# Patient Record
Sex: MALE | Race: WHITE | NOT HISPANIC OR LATINO | URBAN - METROPOLITAN AREA
[De-identification: names, ages, dates, MRNs, and addresses within clinical notes are randomized per-mention and may not be internally consistent; named-entity substitution may affect disease eponyms.]

---

## 2022-03-28 ENCOUNTER — OFFICE (OUTPATIENT)
Dept: URBAN - METROPOLITAN AREA CLINIC 79 | Facility: CLINIC | Age: 52
End: 2022-03-28

## 2022-03-28 VITALS
WEIGHT: 168 LBS | SYSTOLIC BLOOD PRESSURE: 120 MMHG | DIASTOLIC BLOOD PRESSURE: 70 MMHG | HEART RATE: 113 BPM | HEIGHT: 65 IN | TEMPERATURE: 97.5 F

## 2022-03-28 DIAGNOSIS — R19.4 CHANGE IN BOWEL HABIT: ICD-10-CM

## 2022-03-28 DIAGNOSIS — K59.09 OTHER CONSTIPATION: ICD-10-CM

## 2022-03-28 DIAGNOSIS — R12 HEARTBURN: ICD-10-CM

## 2022-03-28 DIAGNOSIS — R19.7 DIARRHEA, UNSPECIFIED: ICD-10-CM

## 2022-03-28 DIAGNOSIS — R19.8 OTHER SPECIFIED SYMPTOMS AND SIGNS INVOLVING THE DIGESTIVE S: ICD-10-CM

## 2022-03-28 DIAGNOSIS — K62.5 HEMORRHAGE OF ANUS AND RECTUM: ICD-10-CM

## 2022-03-28 DIAGNOSIS — R10.13 EPIGASTRIC PAIN: ICD-10-CM

## 2022-03-28 DIAGNOSIS — K30 FUNCTIONAL DYSPEPSIA: ICD-10-CM

## 2022-03-28 DIAGNOSIS — Z87.891 PERSONAL HISTORY OF NICOTINE DEPENDENCE: ICD-10-CM

## 2022-03-28 PROCEDURE — 99204 OFFICE O/P NEW MOD 45 MIN: CPT | Performed by: PHYSICIAN ASSISTANT

## 2022-03-28 NOTE — SERVICEHPINOTES
DANILO EMERY   is a   52   year old white male who is being seen in consultation at the request of   MACY LAMB   for ov prior to colonoscopy. He has BMs 3-5 x/day with irregular stools with mainly diarrhea and over past year new BRBPR. He has mostly diarrhea given BSS type 4-7 range. He notes BRBPR seen on wipe, toilet bowl water, and in stool that is bright red with events at most qod to few days at a time: No pain with defecation. No prior colonoscopy. No fm h/o CRC or colonic polyps. He also mentions h/o GERD for 20 yrs that has been managed on OTC PPIs on and off. He mentions upper abdominal pains since 12/2021 that led to PCP visit who advised RUQ u/s, which he admits was not done due to pain improving on its own. He is on rx Omeprazole 40 mg qd x 3-4 months that helps. Rare NSAID use. Former smoker in teenage years Quit years ago. He is smoker recreational marijuana. H/o anxiety. No known cardiac or pulmonary disease. Denies chest pain, dysphagia, voice change, cough, n/v, decreased appetite,  lower abdominal pain, melena, weight loss. br 
br
br

## 2022-04-28 ENCOUNTER — OFFICE (OUTPATIENT)
Dept: URBAN - METROPOLITAN AREA CLINIC 30 | Facility: CLINIC | Age: 52
End: 2022-04-28
Payer: COMMERCIAL

## 2022-04-28 ENCOUNTER — OFFICE (OUTPATIENT)
Dept: URBAN - METROPOLITAN AREA PATHOLOGY 18 | Facility: PATHOLOGY | Age: 52
End: 2022-04-28
Payer: COMMERCIAL

## 2022-04-28 VITALS
RESPIRATION RATE: 20 BRPM | TEMPERATURE: 98.6 F | HEART RATE: 101 BPM | SYSTOLIC BLOOD PRESSURE: 130 MMHG | DIASTOLIC BLOOD PRESSURE: 90 MMHG | SYSTOLIC BLOOD PRESSURE: 122 MMHG | DIASTOLIC BLOOD PRESSURE: 81 MMHG | WEIGHT: 168 LBS | HEART RATE: 85 BPM | RESPIRATION RATE: 23 BRPM | SYSTOLIC BLOOD PRESSURE: 140 MMHG | HEART RATE: 103 BPM | TEMPERATURE: 97.5 F | HEART RATE: 76 BPM | DIASTOLIC BLOOD PRESSURE: 83 MMHG | DIASTOLIC BLOOD PRESSURE: 95 MMHG | HEART RATE: 99 BPM | DIASTOLIC BLOOD PRESSURE: 86 MMHG | HEART RATE: 95 BPM | RESPIRATION RATE: 18 BRPM | HEIGHT: 65 IN | OXYGEN SATURATION: 100 % | RESPIRATION RATE: 17 BRPM | DIASTOLIC BLOOD PRESSURE: 76 MMHG | HEART RATE: 100 BPM | RESPIRATION RATE: 16 BRPM | SYSTOLIC BLOOD PRESSURE: 120 MMHG | DIASTOLIC BLOOD PRESSURE: 85 MMHG | RESPIRATION RATE: 19 BRPM | DIASTOLIC BLOOD PRESSURE: 104 MMHG | SYSTOLIC BLOOD PRESSURE: 123 MMHG | OXYGEN SATURATION: 98 % | HEART RATE: 104 BPM | RESPIRATION RATE: 22 BRPM | SYSTOLIC BLOOD PRESSURE: 129 MMHG | DIASTOLIC BLOOD PRESSURE: 73 MMHG | SYSTOLIC BLOOD PRESSURE: 169 MMHG | SYSTOLIC BLOOD PRESSURE: 121 MMHG

## 2022-04-28 DIAGNOSIS — K63.5 POLYP OF COLON: ICD-10-CM

## 2022-04-28 DIAGNOSIS — R19.7 DIARRHEA, UNSPECIFIED: ICD-10-CM

## 2022-04-28 DIAGNOSIS — D12.3 BENIGN NEOPLASM OF TRANSVERSE COLON: ICD-10-CM

## 2022-04-28 DIAGNOSIS — K62.1 RECTAL POLYP: ICD-10-CM

## 2022-04-28 DIAGNOSIS — K62.5 HEMORRHAGE OF ANUS AND RECTUM: ICD-10-CM

## 2022-04-28 PROCEDURE — 88305 TISSUE EXAM BY PATHOLOGIST: CPT | Performed by: PATHOLOGY

## 2022-05-03 ENCOUNTER — OFFICE (OUTPATIENT)
Dept: URBAN - METROPOLITAN AREA CLINIC 30 | Facility: CLINIC | Age: 52
End: 2022-05-03

## 2022-05-03 ENCOUNTER — OFFICE (OUTPATIENT)
Dept: URBAN - METROPOLITAN AREA PATHOLOGY 18 | Facility: PATHOLOGY | Age: 52
End: 2022-05-03
Payer: COMMERCIAL

## 2022-05-03 VITALS
HEART RATE: 109 BPM | DIASTOLIC BLOOD PRESSURE: 75 MMHG | HEIGHT: 65 IN | DIASTOLIC BLOOD PRESSURE: 87 MMHG | TEMPERATURE: 98.6 F | SYSTOLIC BLOOD PRESSURE: 132 MMHG | DIASTOLIC BLOOD PRESSURE: 89 MMHG | OXYGEN SATURATION: 97 % | DIASTOLIC BLOOD PRESSURE: 94 MMHG | SYSTOLIC BLOOD PRESSURE: 146 MMHG | SYSTOLIC BLOOD PRESSURE: 120 MMHG | OXYGEN SATURATION: 98 % | RESPIRATION RATE: 20 BRPM | DIASTOLIC BLOOD PRESSURE: 74 MMHG | TEMPERATURE: 97.9 F | OXYGEN SATURATION: 99 % | SYSTOLIC BLOOD PRESSURE: 136 MMHG | RESPIRATION RATE: 18 BRPM | SYSTOLIC BLOOD PRESSURE: 155 MMHG | HEART RATE: 88 BPM | WEIGHT: 168 LBS | HEART RATE: 85 BPM | RESPIRATION RATE: 17 BRPM

## 2022-05-03 DIAGNOSIS — K44.9 DIAPHRAGMATIC HERNIA WITHOUT OBSTRUCTION OR GANGRENE: ICD-10-CM

## 2022-05-03 DIAGNOSIS — K22.89 OTHER SPECIFIED DISEASE OF ESOPHAGUS: ICD-10-CM

## 2022-05-03 DIAGNOSIS — R12 HEARTBURN: ICD-10-CM

## 2022-05-03 DIAGNOSIS — R68.81 EARLY SATIETY: ICD-10-CM

## 2022-05-03 PROCEDURE — 88305 TISSUE EXAM BY PATHOLOGIST: CPT | Performed by: PATHOLOGY

## 2022-05-03 PROCEDURE — 88312 SPECIAL STAINS GROUP 1: CPT | Performed by: PATHOLOGY

## 2022-06-15 ENCOUNTER — OFFICE (OUTPATIENT)
Dept: URBAN - METROPOLITAN AREA CLINIC 34 | Facility: CLINIC | Age: 52
End: 2022-06-15

## 2022-06-15 VITALS
SYSTOLIC BLOOD PRESSURE: 140 MMHG | TEMPERATURE: 97.8 F | HEIGHT: 65 IN | DIASTOLIC BLOOD PRESSURE: 95 MMHG | WEIGHT: 167 LBS | HEART RATE: 87 BPM

## 2022-06-15 DIAGNOSIS — K59.09 OTHER CONSTIPATION: ICD-10-CM

## 2022-06-15 DIAGNOSIS — K62.5 HEMORRHAGE OF ANUS AND RECTUM: ICD-10-CM

## 2022-06-15 DIAGNOSIS — K63.5 POLYP OF COLON: ICD-10-CM

## 2022-06-15 PROCEDURE — 99214 OFFICE O/P EST MOD 30 MIN: CPT | Performed by: PHYSICIAN ASSISTANT

## 2022-06-15 NOTE — SERVICEHPINOTES
DANILO EMERY   is a   51 yo white male who is here for f/u visit concerning rectal bleeding. He was last seen in 03/2022 for same concern that led to colonoscopy. His colonoscopy noted  large internal hemorrhoids and removed benign colonic polyps RC 5 yrs. At this time ongoing events of BRBPR seen on wipe 3-4x/week: No pain with defecation. He has BMs 2-3x/day, BSS type 4-5 with sensation of incomplete evacuation. No recent trial of metamucil or benefiber. Concerning his GERD it has improved on rx Omeprazole 40 mg qd used as instructed. Recent EGD 2022 noting small hiatal hernia and bx benign from esophagus. Rare NSAID use. Former smoker. Denies n/v, abdominal pain, melena, blood in stool, weight loss. 
br

## 2022-08-30 ENCOUNTER — OFFICE (OUTPATIENT)
Dept: URBAN - METROPOLITAN AREA CLINIC 79 | Facility: CLINIC | Age: 52
End: 2022-08-30

## 2022-08-30 VITALS
DIASTOLIC BLOOD PRESSURE: 79 MMHG | WEIGHT: 169.4 LBS | HEART RATE: 115 BPM | HEIGHT: 65 IN | SYSTOLIC BLOOD PRESSURE: 118 MMHG | TEMPERATURE: 96.5 F

## 2022-08-30 DIAGNOSIS — K62.5 HEMORRHAGE OF ANUS AND RECTUM: ICD-10-CM

## 2022-08-30 DIAGNOSIS — K64.1 SECOND DEGREE HEMORRHOIDS: ICD-10-CM

## 2022-08-30 PROCEDURE — 46221 LIGATION OF HEMORRHOID(S): CPT | Performed by: INTERNAL MEDICINE

## 2022-09-15 ENCOUNTER — OFFICE (OUTPATIENT)
Dept: URBAN - METROPOLITAN AREA CLINIC 79 | Facility: CLINIC | Age: 52
End: 2022-09-15

## 2022-09-15 VITALS
SYSTOLIC BLOOD PRESSURE: 136 MMHG | WEIGHT: 169 LBS | HEIGHT: 65 IN | HEART RATE: 111 BPM | TEMPERATURE: 97.8 F | DIASTOLIC BLOOD PRESSURE: 86 MMHG

## 2022-09-15 DIAGNOSIS — K64.1 SECOND DEGREE HEMORRHOIDS: ICD-10-CM

## 2022-09-15 DIAGNOSIS — K62.5 HEMORRHAGE OF ANUS AND RECTUM: ICD-10-CM

## 2022-09-15 PROCEDURE — 46221 LIGATION OF HEMORRHOID(S): CPT | Performed by: INTERNAL MEDICINE

## 2023-04-05 ENCOUNTER — OFFICE (OUTPATIENT)
Dept: URBAN - METROPOLITAN AREA CLINIC 79 | Facility: CLINIC | Age: 53
End: 2023-04-05

## 2023-04-05 VITALS
WEIGHT: 160 LBS | SYSTOLIC BLOOD PRESSURE: 115 MMHG | DIASTOLIC BLOOD PRESSURE: 63 MMHG | TEMPERATURE: 97.5 F | HEIGHT: 65 IN | HEART RATE: 88 BPM

## 2023-04-05 DIAGNOSIS — K62.5 HEMORRHAGE OF ANUS AND RECTUM: ICD-10-CM

## 2023-04-05 DIAGNOSIS — R19.7 DIARRHEA, UNSPECIFIED: ICD-10-CM

## 2023-04-05 DIAGNOSIS — R10.10 UPPER ABDOMINAL PAIN, UNSPECIFIED: ICD-10-CM

## 2023-04-05 DIAGNOSIS — R53.83 OTHER FATIGUE: ICD-10-CM

## 2023-04-05 LAB
CBC/DIFF AMBIGUOUS DEFAULT: BASO (ABSOLUTE): 0 X10E3/UL (ref 0–0.2)
CBC/DIFF AMBIGUOUS DEFAULT: BASOS: 0 %
CBC/DIFF AMBIGUOUS DEFAULT: EOS (ABSOLUTE): 0.1 X10E3/UL (ref 0–0.4)
CBC/DIFF AMBIGUOUS DEFAULT: EOS: 2 %
CBC/DIFF AMBIGUOUS DEFAULT: HEMATOCRIT: 30.3 % — LOW (ref 37.5–51)
CBC/DIFF AMBIGUOUS DEFAULT: HEMATOLOGY COMMENTS: (no result)
CBC/DIFF AMBIGUOUS DEFAULT: HEMOGLOBIN: 10.1 G/DL — LOW (ref 13–17.7)
CBC/DIFF AMBIGUOUS DEFAULT: IMMATURE CELLS: (no result)
CBC/DIFF AMBIGUOUS DEFAULT: IMMATURE GRANS (ABS): 0 X10E3/UL (ref 0–0.1)
CBC/DIFF AMBIGUOUS DEFAULT: IMMATURE GRANULOCYTES: 0 %
CBC/DIFF AMBIGUOUS DEFAULT: LYMPHS (ABSOLUTE): 1.6 X10E3/UL (ref 0.7–3.1)
CBC/DIFF AMBIGUOUS DEFAULT: LYMPHS: 35 %
CBC/DIFF AMBIGUOUS DEFAULT: MCH: 27.7 PG (ref 26.6–33)
CBC/DIFF AMBIGUOUS DEFAULT: MCHC: 33.3 G/DL (ref 31.5–35.7)
CBC/DIFF AMBIGUOUS DEFAULT: MCV: 83 FL (ref 79–97)
CBC/DIFF AMBIGUOUS DEFAULT: MONOCYTES(ABSOLUTE): 0.4 X10E3/UL (ref 0.1–0.9)
CBC/DIFF AMBIGUOUS DEFAULT: MONOCYTES: 8 %
CBC/DIFF AMBIGUOUS DEFAULT: NEUTROPHILS (ABSOLUTE): 2.6 X10E3/UL (ref 1.4–7)
CBC/DIFF AMBIGUOUS DEFAULT: NEUTROPHILS: 55 %
CBC/DIFF AMBIGUOUS DEFAULT: NRBC: (no result)
CBC/DIFF AMBIGUOUS DEFAULT: PLATELETS: 212 X10E3/UL (ref 150–450)
CBC/DIFF AMBIGUOUS DEFAULT: RBC: 3.65 X10E6/UL — LOW (ref 4.14–5.8)
CBC/DIFF AMBIGUOUS DEFAULT: RDW: 14.5 % (ref 11.6–15.4)
CBC/DIFF AMBIGUOUS DEFAULT: WBC: 4.7 X10E3/UL (ref 3.4–10.8)
CELIAC DISEASE PANEL: ENDOMYSIAL ANTIBODY IGA: NEGATIVE
CELIAC DISEASE PANEL: IMMUNOGLOBULIN A, QN, SERUM: 201 MG/DL (ref 90–386)
CELIAC DISEASE PANEL: T-TRANSGLUTAMINASE (TTG) IGA: <2 U/ML
COMP. METABOLIC PANEL (14): A/G RATIO: 2 (ref 1.2–2.2)
COMP. METABOLIC PANEL (14): ALBUMIN: 4.7 G/DL (ref 3.8–4.9)
COMP. METABOLIC PANEL (14): ALKALINE PHOSPHATASE: 102 IU/L (ref 44–121)
COMP. METABOLIC PANEL (14): ALT (SGPT): 39 IU/L (ref 0–44)
COMP. METABOLIC PANEL (14): AST (SGOT): 52 IU/L — HIGH (ref 0–40)
COMP. METABOLIC PANEL (14): BILIRUBIN, TOTAL: 0.5 MG/DL (ref 0–1.2)
COMP. METABOLIC PANEL (14): BUN/CREATININE RATIO: 10 (ref 9–20)
COMP. METABOLIC PANEL (14): BUN: 11 MG/DL (ref 6–24)
COMP. METABOLIC PANEL (14): CALCIUM: 8.8 MG/DL (ref 8.7–10.2)
COMP. METABOLIC PANEL (14): CARBON DIOXIDE, TOTAL: 21 MMOL/L (ref 20–29)
COMP. METABOLIC PANEL (14): CHLORIDE: 80 MMOL/L — LOW (ref 96–106)
COMP. METABOLIC PANEL (14): CREATININE: 1.09 MG/DL (ref 0.76–1.27)
COMP. METABOLIC PANEL (14): EGFR: 81 ML/MIN/1.73 (ref 59–?)
COMP. METABOLIC PANEL (14): GLOBULIN, TOTAL: 2.3 G/DL (ref 1.5–4.5)
COMP. METABOLIC PANEL (14): GLUCOSE: 104 MG/DL — HIGH (ref 70–99)
COMP. METABOLIC PANEL (14): POTASSIUM: 3 MMOL/L — LOW (ref 3.5–5.2)
COMP. METABOLIC PANEL (14): PROTEIN, TOTAL: 7 G/DL (ref 6–8.5)
COMP. METABOLIC PANEL (14): SODIUM: 122 MMOL/L — LOW (ref 134–144)
HCV ANTIBODY RFX TO QUANT PCR: HCV AB: NON REACTIVE
HEMOGLOBIN A1C: 5.8 % — HIGH (ref 4.8–5.6)
INTERPRETATION: (no result)
PT AND PTT: APTT: 26 SEC (ref 24–33)
PT AND PTT: INR: 1 (ref 0.9–1.2)
PT AND PTT: PROTHROMBIN TIME: 10.4 SEC (ref 9.1–12)
VITAMIN D, 25-HYDROXY: 25.6 NG/ML — LOW (ref 30–100)

## 2023-04-05 PROCEDURE — 99215 OFFICE O/P EST HI 40 MIN: CPT | Performed by: PHYSICIAN ASSISTANT

## 2023-04-05 NOTE — SERVICEHPINOTES
52 yo male presents with recent flare-up of upper abdominal soreness. Has had this issue in the past, for at least a year. Has discomfort across upper abdomen constantly in the past week. Pain is 1-2 of 10 constantly, but can flare up to 5 of 10 in intensity and lasts for 15-20 minutes when more intense. He sits and takes deep breaths and pain will subside. He denies pain triggered by PO intake or associated with BMs. br
crow He has h/o acid reflux and took OTC meds for a long time but has been on omeprazole 40 mg since late 2021. Had EGD and colonoscopy in 2022 with benign findings. Has had hemorrhoidal bandings due to hemorrhoidal bleeding. He had tubular adenoma, 5-year f/u advised. Ileal and random colon biopsies negative. br
crow He went to Urgent care last week because he "felt horrible" - had nausea, diarrhea, lethargy/fatigue. Was told he had anemia. Just had labs this week with elevated creatinine and low-normal ferritin, iron sat 22, folate, B12. No CBC or liver enzymes. crow maria Almost always has diarrhea or loose stools since 2021. Saw us for this in 2022 but only had the colonoscopy and no further evaluation. Did have negative duodenal bx at time of EGD. crow
crow Drinks several beers a day. Can see food particles in stools. Denies a lot of gas/bloating. 
br
br No abdominal imaging other than negative GES. crow maria Has had to miss work for 1-2 days a month for the past 6 months due to his symptoms - fatigue, pain, diarrhea. crow

crow Had had some skin issues on arms in the past 6 months. Was going to see derm but felt symptoms were improving so hasn't gone. crow

## 2023-04-05 NOTE — INTERFACERESULTNOTES
Anemia - please refer to hematologist for further evaluation and treatment. Low potassium - please increase high potassium foods daily for now (coconut water, cantaloupe, sweet potatoes, bananas, etc. Low vitamin D, please take 5000 IU/day with food. Hep C test is negative. Please stop drinking alcohol. Repeat a CMP in 10-14 days. Schedule f/u visit for after the banding in May.

## 2023-04-07 LAB
C DIFFICILE TOXIN GENE NAA: NEGATIVE
CALPROTECTIN, FECAL: 56 UG/G (ref 0–120)
CELIAC DISEASE COMPREHENSIVE: DEAMIDATED GLIADIN ABS, IGA: (no result) UNITS
CELIAC DISEASE COMPREHENSIVE: DEAMIDATED GLIADIN ABS, IGG: (no result)
CELIAC DISEASE COMPREHENSIVE: ENDOMYSIAL ANTIBODY IGA: (no result)
CELIAC DISEASE COMPREHENSIVE: IMMUNOGLOBULIN A, QN, SERUM: (no result)
CELIAC DISEASE COMPREHENSIVE: T-TRANSGLUTAMINASE (TTG) IGA: (no result)
CELIAC DISEASE COMPREHENSIVE: T-TRANSGLUTAMINASE (TTG) IGG: (no result)
GIARDIA LAMBLIA AG, EIA: NEGATIVE
OVA + PARASITE EXAM: (no result)
PANCREATIC ELASTASE, FECAL: >500 UG ELAST./G
REQUEST PROBLEM: (no result)
RESULT: RESULT 1: (no result)
STOOL CULTURE: CAMPYLOBACTER CULTURE: (no result)
STOOL CULTURE: E COLI SHIGA TOXIN EIA: NEGATIVE
STOOL CULTURE: SALMONELLA/SHIGELLA SCREEN: (no result)

## 2023-04-14 PROBLEM — R68.81 EARLY SATIETY: Status: ACTIVE | Noted: 2022-05-03

## 2023-05-02 ENCOUNTER — OFFICE (OUTPATIENT)
Dept: URBAN - METROPOLITAN AREA CLINIC 79 | Facility: CLINIC | Age: 53
End: 2023-05-02

## 2023-05-02 VITALS
WEIGHT: 158 LBS | HEIGHT: 65 IN | TEMPERATURE: 98.8 F | SYSTOLIC BLOOD PRESSURE: 114 MMHG | DIASTOLIC BLOOD PRESSURE: 75 MMHG | HEART RATE: 83 BPM

## 2023-05-02 DIAGNOSIS — K64.1 SECOND DEGREE HEMORRHOIDS: ICD-10-CM

## 2023-05-02 DIAGNOSIS — K62.5 HEMORRHAGE OF ANUS AND RECTUM: ICD-10-CM

## 2023-05-02 PROCEDURE — 46221 LIGATION OF HEMORRHOID(S): CPT | Performed by: INTERNAL MEDICINE

## 2023-05-24 ENCOUNTER — OFFICE (OUTPATIENT)
Dept: URBAN - METROPOLITAN AREA CLINIC 79 | Facility: CLINIC | Age: 53
End: 2023-05-24

## 2023-05-24 VITALS
SYSTOLIC BLOOD PRESSURE: 119 MMHG | HEART RATE: 83 BPM | HEIGHT: 65 IN | WEIGHT: 161 LBS | DIASTOLIC BLOOD PRESSURE: 68 MMHG | TEMPERATURE: 98.6 F

## 2023-05-24 DIAGNOSIS — D64.9 ANEMIA, UNSPECIFIED: ICD-10-CM

## 2023-05-24 DIAGNOSIS — R19.7 DIARRHEA, UNSPECIFIED: ICD-10-CM

## 2023-05-24 LAB
CBC WITH DIFFERENTIAL/PLATELET: BASO (ABSOLUTE): 0.1 X10E3/UL (ref 0–0.2)
CBC WITH DIFFERENTIAL/PLATELET: BASOS: 1 %
CBC WITH DIFFERENTIAL/PLATELET: EOS (ABSOLUTE): 0.3 X10E3/UL (ref 0–0.4)
CBC WITH DIFFERENTIAL/PLATELET: EOS: 5 %
CBC WITH DIFFERENTIAL/PLATELET: HEMATOCRIT: 30.2 % — LOW (ref 37.5–51)
CBC WITH DIFFERENTIAL/PLATELET: HEMATOLOGY COMMENTS: (no result)
CBC WITH DIFFERENTIAL/PLATELET: HEMOGLOBIN: 9.3 G/DL — LOW (ref 13–17.7)
CBC WITH DIFFERENTIAL/PLATELET: IMMATURE CELLS: (no result)
CBC WITH DIFFERENTIAL/PLATELET: IMMATURE GRANS (ABS): 0 X10E3/UL (ref 0–0.1)
CBC WITH DIFFERENTIAL/PLATELET: IMMATURE GRANULOCYTES: 0 %
CBC WITH DIFFERENTIAL/PLATELET: LYMPHS (ABSOLUTE): 1.9 X10E3/UL (ref 0.7–3.1)
CBC WITH DIFFERENTIAL/PLATELET: LYMPHS: 30 %
CBC WITH DIFFERENTIAL/PLATELET: MCH: 24.3 PG — LOW (ref 26.6–33)
CBC WITH DIFFERENTIAL/PLATELET: MCHC: 30.8 G/DL — LOW (ref 31.5–35.7)
CBC WITH DIFFERENTIAL/PLATELET: MCV: 79 FL (ref 79–97)
CBC WITH DIFFERENTIAL/PLATELET: MONOCYTES(ABSOLUTE): 0.6 X10E3/UL (ref 0.1–0.9)
CBC WITH DIFFERENTIAL/PLATELET: MONOCYTES: 9 %
CBC WITH DIFFERENTIAL/PLATELET: NEUTROPHILS (ABSOLUTE): 3.4 X10E3/UL (ref 1.4–7)
CBC WITH DIFFERENTIAL/PLATELET: NEUTROPHILS: 55 %
CBC WITH DIFFERENTIAL/PLATELET: NRBC: (no result)
CBC WITH DIFFERENTIAL/PLATELET: PLATELETS: 259 X10E3/UL (ref 150–450)
CBC WITH DIFFERENTIAL/PLATELET: RBC: 3.82 X10E6/UL — LOW (ref 4.14–5.8)
CBC WITH DIFFERENTIAL/PLATELET: RDW: 14.7 % (ref 11.6–15.4)
CBC WITH DIFFERENTIAL/PLATELET: WBC: 6.2 X10E3/UL (ref 3.4–10.8)
FERRITIN: 7 NG/ML — LOW (ref 30–400)
IRON AND TIBC: IRON BIND.CAP.(TIBC): 432 UG/DL (ref 250–450)
IRON AND TIBC: IRON SATURATION: 4 % — CRITICAL LOW (ref 15–55)
IRON AND TIBC: IRON: 18 UG/DL — LOW (ref 38–169)
IRON AND TIBC: UIBC: 414 UG/DL — HIGH (ref 111–343)

## 2023-05-24 PROCEDURE — 99214 OFFICE O/P EST MOD 30 MIN: CPT | Performed by: PHYSICIAN ASSISTANT

## 2023-05-24 NOTE — INTERFACERESULTNOTES
Iron deficiency anemia. Still need fecal calpro stool test results. Please order CT enterography for diagnosis of iron deficiency anemia, diarrhea. Recommend he schedule f/u visit as well, and start Vitron C iron pills - 1 daily, available OTC. Repeat CBC in 1 month.

## 2023-05-24 NOTE — INTERFACERESULTNOTES
given worsening in stool test and iron-deficiency anemia, would schedule a colonoscopy in addition to the recommendations under recent lab results (see notes there as well). If unable to reach patient or any of his contacts, please send a certified letter as it is important that he follows up.

## 2023-05-24 NOTE — SERVICEHPINOTES
52 yo male presents for f/u abdominal discomfort, loose stools, and fatigue. 
crow maria His labs showed a mild normocytic anemia for which he was referred to hematology (in early April he had normal B12, folate, and ferritin was 51), but states he canceled his appointment as his rectal bleeding has resolved since his last hemorrhoid banding visit on May 2nd. He also had mild liver enzyme elevation and low potassium, both of which normalized on repeat labs. 
br
crow His stool tests were negative except fecal calpro was just into the borderline region at 56.br
crow He had an U/S showing fatty liver. Left renal cyst noted as well - he will discuss with his PCP. 
br
crow He has cut out ETOH - notes increased irritability off of this (will be talking to PCP about his mood soon), but overall he is feeling better in terms of his GI symptoms. He reports formed stools now and he notes healthier dietary choices. Abdominal pain is better. 
crow maria Overall he is pleased with his progress. He has h/o acid reflux and took OTC meds for a long time but has been on omeprazole 40 mg since late 2021. Had EGD and colonoscopy in 2022 with benign findings. He had tubular adenoma, 5-year f/u advised. Ileal and random colon biopsies negative. Has had hemorrhoidal bandings due to hemorrhoidal bleeding.crow

## 2023-06-21 LAB
CALPROTECTIN, FECAL: 109 UG/G (ref 0–120)
CBC WITH DIFFERENTIAL/PLATELET: BASO (ABSOLUTE): (no result)
CBC WITH DIFFERENTIAL/PLATELET: BASOS: (no result)
CBC WITH DIFFERENTIAL/PLATELET: EOS (ABSOLUTE): (no result)
CBC WITH DIFFERENTIAL/PLATELET: EOS: (no result)
CBC WITH DIFFERENTIAL/PLATELET: HEMATOCRIT: (no result)
CBC WITH DIFFERENTIAL/PLATELET: HEMATOLOGY COMMENTS: (no result)
CBC WITH DIFFERENTIAL/PLATELET: HEMOGLOBIN: (no result)
CBC WITH DIFFERENTIAL/PLATELET: IMMATURE CELLS: (no result)
CBC WITH DIFFERENTIAL/PLATELET: IMMATURE GRANS (ABS): (no result)
CBC WITH DIFFERENTIAL/PLATELET: IMMATURE GRANULOCYTES: (no result)
CBC WITH DIFFERENTIAL/PLATELET: LYMPHS (ABSOLUTE): (no result)
CBC WITH DIFFERENTIAL/PLATELET: LYMPHS: (no result)
CBC WITH DIFFERENTIAL/PLATELET: MCH: (no result)
CBC WITH DIFFERENTIAL/PLATELET: MCHC: (no result)
CBC WITH DIFFERENTIAL/PLATELET: MCV: (no result)
CBC WITH DIFFERENTIAL/PLATELET: MONOCYTES(ABSOLUTE): (no result)
CBC WITH DIFFERENTIAL/PLATELET: MONOCYTES: (no result)
CBC WITH DIFFERENTIAL/PLATELET: NEUTROPHILS (ABSOLUTE): (no result)
CBC WITH DIFFERENTIAL/PLATELET: NEUTROPHILS: (no result)
CBC WITH DIFFERENTIAL/PLATELET: NRBC: (no result)
CBC WITH DIFFERENTIAL/PLATELET: PLATELETS: (no result)
CBC WITH DIFFERENTIAL/PLATELET: RBC: (no result)
CBC WITH DIFFERENTIAL/PLATELET: RDW: (no result)
CBC WITH DIFFERENTIAL/PLATELET: WBC: (no result) X10E3/UL
FE+TIBC+FER: FERRITIN: (no result)
FE+TIBC+FER: IRON BIND.CAP.(TIBC): (no result)
FE+TIBC+FER: IRON SATURATION: (no result)
FE+TIBC+FER: IRON: (no result)
FE+TIBC+FER: UIBC: (no result) UG/DL
REQUEST PROBLEM: (no result)
REQUEST PROBLEM: (no result)